# Patient Record
Sex: MALE | NOT HISPANIC OR LATINO | Employment: OTHER | URBAN - METROPOLITAN AREA
[De-identification: names, ages, dates, MRNs, and addresses within clinical notes are randomized per-mention and may not be internally consistent; named-entity substitution may affect disease eponyms.]

---

## 2021-10-02 ENCOUNTER — HOSPITAL ENCOUNTER (EMERGENCY)
Facility: HOSPITAL | Age: 61
Discharge: HOME | End: 2021-10-02
Attending: EMERGENCY MEDICINE | Admitting: EMERGENCY MEDICINE

## 2021-10-02 ENCOUNTER — APPOINTMENT (OUTPATIENT)
Dept: GENERAL RADIOLOGY | Facility: HOSPITAL | Age: 61
End: 2021-10-02

## 2021-10-02 VITALS
SYSTOLIC BLOOD PRESSURE: 111 MMHG | TEMPERATURE: 98.4 F | HEART RATE: 65 BPM | DIASTOLIC BLOOD PRESSURE: 72 MMHG | RESPIRATION RATE: 16 BRPM | OXYGEN SATURATION: 99 %
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
## 2021-10-02 DIAGNOSIS — S40.021A ARM CONTUSION, RIGHT, INITIAL ENCOUNTER: ICD-10-CM

## 2021-10-02 DIAGNOSIS — S76.011A HIP STRAIN, RIGHT, INITIAL ENCOUNTER: ICD-10-CM

## 2021-10-02 PROCEDURE — 73030 X-RAY EXAM OF SHOULDER: CPT

## 2021-10-02 PROCEDURE — 73502 X-RAY EXAM HIP UNI 2-3 VIEWS: CPT

## 2021-10-02 PROCEDURE — 99283 EMERGENCY DEPT VISIT LOW MDM: CPT

## 2021-10-02 PROCEDURE — 73060 X-RAY EXAM OF HUMERUS: CPT

## 2021-10-02 NOTE — DISCHARGE INSTRUCTIONS
Home to rest  Ice to sore areas  Tylenol for pain  Follow up with your doctor  Return if worse or new concerns such as abdominal pain, headaches, or chest pain or difficulty breathing.   Continue care with your primary care physician and have your blood pressure regularly checked and managed. Normal blood pressure is 120/80.
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
## 2023-11-11 ENCOUNTER — APPOINTMENT (OUTPATIENT)
Dept: CT IMAGING | Facility: HOSPITAL | Age: 63
End: 2023-11-11

## 2023-11-11 ENCOUNTER — HOSPITAL ENCOUNTER (EMERGENCY)
Facility: HOSPITAL | Age: 63
Discharge: HOME | End: 2023-11-11
Attending: EMERGENCY MEDICINE

## 2023-11-11 VITALS
RESPIRATION RATE: 18 BRPM | SYSTOLIC BLOOD PRESSURE: 134 MMHG | BODY MASS INDEX: 21.48 KG/M2 | DIASTOLIC BLOOD PRESSURE: 67 MMHG | TEMPERATURE: 96.8 F | WEIGHT: 145 LBS | HEART RATE: 54 BPM | HEIGHT: 69 IN | OXYGEN SATURATION: 98 %

## 2023-11-11 DIAGNOSIS — M54.50 ACUTE BILATERAL LOW BACK PAIN WITHOUT SCIATICA: Primary | ICD-10-CM

## 2023-11-11 LAB
ALBUMIN SERPL-MCNC: 4.4 G/DL (ref 3.5–5.2)
ALBUMIN/GLOB SERPL: 2.1 G/DL
ALP SERPL-CCNC: 69 U/L (ref 39–117)
ALT SERPL W P-5'-P-CCNC: 15 U/L (ref 1–41)
ANION GAP SERPL CALCULATED.3IONS-SCNC: 7.5 MMOL/L (ref 5–15)
AST SERPL-CCNC: 22 U/L (ref 1–40)
BASOPHILS # BLD AUTO: 0.04 10*3/MM3 (ref 0–0.2)
BASOPHILS NFR BLD AUTO: 0.6 % (ref 0–1.5)
BILIRUB SERPL-MCNC: 0.4 MG/DL (ref 0–1.2)
BILIRUB UR QL STRIP: NEGATIVE
BUN SERPL-MCNC: 15 MG/DL (ref 8–23)
BUN/CREAT SERPL: 15 (ref 7–25)
CALCIUM SPEC-SCNC: 9.1 MG/DL (ref 8.6–10.5)
CHLORIDE SERPL-SCNC: 109 MMOL/L (ref 98–107)
CLARITY UR: CLEAR
CO2 SERPL-SCNC: 25.5 MMOL/L (ref 22–29)
COLOR UR: YELLOW
CREAT SERPL-MCNC: 1 MG/DL (ref 0.76–1.27)
DEPRECATED RDW RBC AUTO: 44 FL (ref 37–54)
EGFRCR SERPLBLD CKD-EPI 2021: 85.1 ML/MIN/1.73
EOSINOPHIL # BLD AUTO: 0.15 10*3/MM3 (ref 0–0.4)
EOSINOPHIL NFR BLD AUTO: 2.4 % (ref 0.3–6.2)
ERYTHROCYTE [DISTWIDTH] IN BLOOD BY AUTOMATED COUNT: 12.8 % (ref 12.3–15.4)
GLOBULIN UR ELPH-MCNC: 2.1 GM/DL
GLUCOSE SERPL-MCNC: 98 MG/DL (ref 65–99)
GLUCOSE UR STRIP-MCNC: NEGATIVE MG/DL
HCT VFR BLD AUTO: 40.3 % (ref 37.5–51)
HGB BLD-MCNC: 13.7 G/DL (ref 13–17.7)
HGB UR QL STRIP.AUTO: NEGATIVE
IMM GRANULOCYTES # BLD AUTO: 0.02 10*3/MM3 (ref 0–0.05)
IMM GRANULOCYTES NFR BLD AUTO: 0.3 % (ref 0–0.5)
KETONES UR QL STRIP: NEGATIVE
LEUKOCYTE ESTERASE UR QL STRIP.AUTO: NEGATIVE
LIPASE SERPL-CCNC: 16 U/L (ref 13–60)
LYMPHOCYTES # BLD AUTO: 2.77 10*3/MM3 (ref 0.7–3.1)
LYMPHOCYTES NFR BLD AUTO: 43.5 % (ref 19.6–45.3)
MCH RBC QN AUTO: 32 PG (ref 26.6–33)
MCHC RBC AUTO-ENTMCNC: 34 G/DL (ref 31.5–35.7)
MCV RBC AUTO: 94.2 FL (ref 79–97)
MONOCYTES # BLD AUTO: 0.51 10*3/MM3 (ref 0.1–0.9)
MONOCYTES NFR BLD AUTO: 8 % (ref 5–12)
NEUTROPHILS NFR BLD AUTO: 2.88 10*3/MM3 (ref 1.7–7)
NEUTROPHILS NFR BLD AUTO: 45.2 % (ref 42.7–76)
NITRITE UR QL STRIP: NEGATIVE
NRBC BLD AUTO-RTO: 0 /100 WBC (ref 0–0.2)
PH UR STRIP.AUTO: <=5 [PH] (ref 5–8)
PLATELET # BLD AUTO: 230 10*3/MM3 (ref 140–450)
PMV BLD AUTO: 8.9 FL (ref 6–12)
POTASSIUM SERPL-SCNC: 4.4 MMOL/L (ref 3.5–5.2)
PROT SERPL-MCNC: 6.5 G/DL (ref 6–8.5)
PROT UR QL STRIP: NEGATIVE
RBC # BLD AUTO: 4.28 10*6/MM3 (ref 4.14–5.8)
SODIUM SERPL-SCNC: 142 MMOL/L (ref 136–145)
SP GR UR STRIP: 1.01 (ref 1–1.03)
UROBILINOGEN UR QL STRIP: NORMAL
WBC NRBC COR # BLD: 6.37 10*3/MM3 (ref 3.4–10.8)

## 2023-11-11 PROCEDURE — 96374 THER/PROPH/DIAG INJ IV PUSH: CPT

## 2023-11-11 PROCEDURE — 96375 TX/PRO/DX INJ NEW DRUG ADDON: CPT

## 2023-11-11 PROCEDURE — 25010000002 HYDROMORPHONE PER 4 MG: Performed by: EMERGENCY MEDICINE

## 2023-11-11 PROCEDURE — 83690 ASSAY OF LIPASE: CPT | Performed by: EMERGENCY MEDICINE

## 2023-11-11 PROCEDURE — 81003 URINALYSIS AUTO W/O SCOPE: CPT | Performed by: EMERGENCY MEDICINE

## 2023-11-11 PROCEDURE — 85025 COMPLETE CBC W/AUTO DIFF WBC: CPT | Performed by: EMERGENCY MEDICINE

## 2023-11-11 PROCEDURE — 74176 CT ABD & PELVIS W/O CONTRAST: CPT

## 2023-11-11 PROCEDURE — 99284 EMERGENCY DEPT VISIT MOD MDM: CPT

## 2023-11-11 PROCEDURE — 80053 COMPREHEN METABOLIC PANEL: CPT | Performed by: EMERGENCY MEDICINE

## 2023-11-11 PROCEDURE — 25010000002 ONDANSETRON PER 1 MG: Performed by: EMERGENCY MEDICINE

## 2023-11-11 RX ORDER — SODIUM CHLORIDE 0.9 % (FLUSH) 0.9 %
10 SYRINGE (ML) INJECTION AS NEEDED
Status: DISCONTINUED | OUTPATIENT
Start: 2023-11-11 | End: 2023-11-11 | Stop reason: HOSPADM

## 2023-11-11 RX ORDER — ONDANSETRON 2 MG/ML
4 INJECTION INTRAMUSCULAR; INTRAVENOUS ONCE
Status: COMPLETED | OUTPATIENT
Start: 2023-11-11 | End: 2023-11-11

## 2023-11-11 RX ORDER — CYCLOBENZAPRINE HCL 10 MG
10 TABLET ORAL 3 TIMES DAILY PRN
Qty: 15 TABLET | Refills: 0 | Status: SHIPPED | OUTPATIENT
Start: 2023-11-11

## 2023-11-11 RX ORDER — HYDROCODONE BITARTRATE AND ACETAMINOPHEN 5; 325 MG/1; MG/1
1 TABLET ORAL EVERY 6 HOURS PRN
Qty: 8 TABLET | Refills: 0 | Status: SHIPPED | OUTPATIENT
Start: 2023-11-11

## 2023-11-11 RX ORDER — HYDROMORPHONE HYDROCHLORIDE 1 MG/ML
0.5 INJECTION, SOLUTION INTRAMUSCULAR; INTRAVENOUS; SUBCUTANEOUS ONCE
Status: COMPLETED | OUTPATIENT
Start: 2023-11-11 | End: 2023-11-11

## 2023-11-11 RX ADMIN — ONDANSETRON 4 MG: 2 INJECTION INTRAMUSCULAR; INTRAVENOUS at 11:31

## 2023-11-11 RX ADMIN — HYDROMORPHONE HYDROCHLORIDE 0.5 MG: 1 INJECTION, SOLUTION INTRAMUSCULAR; INTRAVENOUS; SUBCUTANEOUS at 11:33

## 2023-11-11 NOTE — ED PROVIDER NOTES
EMERGENCY DEPARTMENT ENCOUNTER    Room Number:  26/26  PCP: Benny Kovacs MD  Historian: Patient      HPI:  Chief Complaint: Bilateral lower back pain  A complete HPI/ROS/PMH/PSH/SH/FH are unobtainable due to: None  Context: Reese Chery is a 63 y.o. male who presents to the ED c/o bilateral low back pain.  Patient has not had significant back issues in the past.  States pain started yesterday gradually.  Bilateral lower radiating to the front.  Patient states it is definitely worse with movement.  Has had no blood in the urine.  No burning with urination.  Pain does not go down the legs.  Has had no vomiting or diarrhea.            PAST MEDICAL HISTORY  Active Ambulatory Problems     Diagnosis Date Noted    No Active Ambulatory Problems     Resolved Ambulatory Problems     Diagnosis Date Noted    No Resolved Ambulatory Problems     Past Medical History:   Diagnosis Date    Renal disorder          PAST SURGICAL HISTORY  History reviewed. No pertinent surgical history.      FAMILY HISTORY  History reviewed. No pertinent family history.      SOCIAL HISTORY  Social History     Socioeconomic History    Marital status:    Tobacco Use    Smoking status: Every Day     Types: Cigarettes   Substance and Sexual Activity    Alcohol use: Never    Drug use: Never         ALLERGIES  Patient has no known allergies.        REVIEW OF SYSTEMS  Review of Systems   Back pain      PHYSICAL EXAM  ED Triage Vitals   Temp Heart Rate Resp BP SpO2   11/11/23 1102 11/11/23 1102 11/11/23 1102 11/11/23 1108 11/11/23 1102   96.8 °F (36 °C) 77 18 134/76 99 %      Temp src Heart Rate Source Patient Position BP Location FiO2 (%)   -- -- -- -- --              Physical Exam      GENERAL: Patient appears uncomfortable  HENT: nares patent  EYES: no scleral icterus  CV: regular rhythm, normal rate  RESPIRATORY: normal effort  ABDOMEN: soft  MUSCULOSKELETAL: no deformity.  Tenderness to bilateral lower back  NEURO: alert, moves all  extremities, follows commands  PSYCH:  calm, cooperative  SKIN: warm, dry    Vital signs and nursing notes reviewed.          LAB RESULTS  Recent Results (from the past 24 hour(s))   Comprehensive Metabolic Panel    Collection Time: 11/11/23 11:34 AM    Specimen: Blood   Result Value Ref Range    Glucose 98 65 - 99 mg/dL    BUN 15 8 - 23 mg/dL    Creatinine 1.00 0.76 - 1.27 mg/dL    Sodium 142 136 - 145 mmol/L    Potassium 4.4 3.5 - 5.2 mmol/L    Chloride 109 (H) 98 - 107 mmol/L    CO2 25.5 22.0 - 29.0 mmol/L    Calcium 9.1 8.6 - 10.5 mg/dL    Total Protein 6.5 6.0 - 8.5 g/dL    Albumin 4.4 3.5 - 5.2 g/dL    ALT (SGPT) 15 1 - 41 U/L    AST (SGOT) 22 1 - 40 U/L    Alkaline Phosphatase 69 39 - 117 U/L    Total Bilirubin 0.4 0.0 - 1.2 mg/dL    Globulin 2.1 gm/dL    A/G Ratio 2.1 g/dL    BUN/Creatinine Ratio 15.0 7.0 - 25.0    Anion Gap 7.5 5.0 - 15.0 mmol/L    eGFR 85.1 >60.0 mL/min/1.73   Lipase    Collection Time: 11/11/23 11:34 AM    Specimen: Blood   Result Value Ref Range    Lipase 16 13 - 60 U/L   CBC Auto Differential    Collection Time: 11/11/23 11:34 AM    Specimen: Blood   Result Value Ref Range    WBC 6.37 3.40 - 10.80 10*3/mm3    RBC 4.28 4.14 - 5.80 10*6/mm3    Hemoglobin 13.7 13.0 - 17.7 g/dL    Hematocrit 40.3 37.5 - 51.0 %    MCV 94.2 79.0 - 97.0 fL    MCH 32.0 26.6 - 33.0 pg    MCHC 34.0 31.5 - 35.7 g/dL    RDW 12.8 12.3 - 15.4 %    RDW-SD 44.0 37.0 - 54.0 fl    MPV 8.9 6.0 - 12.0 fL    Platelets 230 140 - 450 10*3/mm3    Neutrophil % 45.2 42.7 - 76.0 %    Lymphocyte % 43.5 19.6 - 45.3 %    Monocyte % 8.0 5.0 - 12.0 %    Eosinophil % 2.4 0.3 - 6.2 %    Basophil % 0.6 0.0 - 1.5 %    Immature Grans % 0.3 0.0 - 0.5 %    Neutrophils, Absolute 2.88 1.70 - 7.00 10*3/mm3    Lymphocytes, Absolute 2.77 0.70 - 3.10 10*3/mm3    Monocytes, Absolute 0.51 0.10 - 0.90 10*3/mm3    Eosinophils, Absolute 0.15 0.00 - 0.40 10*3/mm3    Basophils, Absolute 0.04 0.00 - 0.20 10*3/mm3    Immature Grans, Absolute 0.02 0.00 -  0.05 10*3/mm3    nRBC 0.0 0.0 - 0.2 /100 WBC   Urinalysis With Microscopic If Indicated (No Culture) - Urine, Clean Catch    Collection Time: 11/11/23 12:31 PM    Specimen: Urine, Clean Catch   Result Value Ref Range    Color, UA Yellow Yellow, Straw    Appearance, UA Clear Clear    pH, UA <=5.0 5.0 - 8.0    Specific Gravity, UA 1.010 1.005 - 1.030    Glucose, UA Negative Negative    Ketones, UA Negative Negative    Bilirubin, UA Negative Negative    Blood, UA Negative Negative    Protein, UA Negative Negative    Leuk Esterase, UA Negative Negative    Nitrite, UA Negative Negative    Urobilinogen, UA 0.2 E.U./dL 0.2 - 1.0 E.U./dL       Ordered the above labs and reviewed the results.        RADIOLOGY  CT Abdomen Pelvis Without Contrast    Result Date: 11/11/2023  EXAMINATION: CT OF THE ABDOMEN AND PELVIS WITHOUT CONTRAST  HISTORY: 63-year-old male with a history of abdominal pain and flank pain.  TECHNIQUE: Contiguous axial images were obtained through the abdomen and pelvis without intravenous administration of nonionic contrast. Oral contrast was not administered. Radiation dose reduction techniques were utilized, including automated exposure control and exposure modulation based on body size.  COMPARISON: None  FINDINGS: The visualized portion of the lung bases demonstrate moderate bilateral lobe paraseptal emphysematous change. The visualized portion of the heart appears normal. The liver has a normal appearance. The pancreas appears normal. There is a 1.4 x 0.7 x 1.8 cm right renal pelvis staghorn calculus. There is a 1.6 x 1.3 x 2.3 cm left renal pelvis staghorn calculus. There is no hydronephrosis or hydroureter. The urinary bladder appears normal. The adrenal glands have a normal appearance. The spleen appears normal. The bowel appears normal. L5-S1 degenerative disc disease with abutting endplate osteophytic change is noted.       There are bilateral renal staghorn calculi as described. No hydronephrosis or  hydroureter is appreciated. A ureteral and bladder calculus is not visualized.       Ordered the above noted radiological studies.  CT abdomen independently interpreted by me and shows no evidence of passing kidney stone          PROCEDURES  Procedures            MEDICATIONS GIVEN IN ER  Medications   sodium chloride 0.9 % flush 10 mL (has no administration in time range)   HYDROmorphone (DILAUDID) injection 0.5 mg (0.5 mg Intravenous Given 11/11/23 1133)   ondansetron (ZOFRAN) injection 4 mg (4 mg Intravenous Given 11/11/23 1131)                   MEDICAL DECISION MAKING, PROGRESS, and CONSULTS     Discussion below represents my analysis of pertinent findings related to patient's condition, differential diagnosis, treatment plan and final disposition.      Additional sources:  - Discussed/ obtained information from independent historians: None    - External (non-ED) record review: Epic reviewed patient last seen by primary provider 9/23/2020 for hyperlipidemia    - Chronic or social conditions impacting care: None    - Shared decision making: None      Orders placed during this visit:  Orders Placed This Encounter   Procedures    CT Abdomen Pelvis Without Contrast    Comprehensive Metabolic Panel    Lipase    Urinalysis With Microscopic If Indicated (No Culture) - Urine, Clean Catch    CBC Auto Differential    Insert Peripheral IV    CBC & Differential         Additional orders considered but not ordered:  None        Differential diagnosis includes but is not limited to:    Musculoskeletal back pain versus sciatica versus kidney stone versus kidney infection      Independent interpretation of labs, radiology studies, and discussions with consultants:  ED Course as of 11/11/23 1313   Sat Nov 11, 2023   1254 12:54 EST  Patient presents for bilateral lower back pain.  Was concerned about kidney stone.  Patient does have stones in his kidneys but is not passing a kidney stone.  Patient also has no evidence of kidney  infection.  Has been given pain medicine here with improvement of symptoms.  We will start him on muscle relaxer.  We will give him small amount of pain medication.  We will give him the name of [facility] to follow-up for his stones.  He is to follow-up with his primary doctor for his back pain. [SL]      ED Course User Index  [SL] Fredo Terrell MD                 DIAGNOSIS  Final diagnoses:   Acute bilateral low back pain without sciatica         DISPOSITION  DISCHARGE    Patient discharged in stable condition.    Reviewed implications of results, diagnosis, meds, responsibility to follow up, warning signs and symptoms of possible worsening, potential complications and reasons to return to ER, including worsening symptoms    Patient/Family voiced understanding of above instructions.    Discussed plan for discharge, as there is no emergent indication for admission. Patient referred to primary care provider for BP management due to today's BP. Pt/family is agreeable and understands need for follow up and repeat testing.  Pt is aware that discharge does not mean that nothing is wrong but it indicates no emergency is present that requires admission and they must continue care with follow-up as given below or physician of their choice.     FOLLOW-UP  Benny Kovacs MD  [address]  [address] [id number] [phone]    Schedule an appointment as soon as possible for a visit       [facility]  [address] [id number] [phone]  Schedule an appointment as soon as possible for a visit            Medication List        New Prescriptions      cyclobenzaprine 10 MG tablet  Commonly known as: FLEXERIL  Take 1 tablet by mouth 3 (Three) Times a Day As Needed for Muscle Spasms.     HYDROcodone-acetaminophen 5-325 MG per tablet  Commonly known as: NORCO  Take 1 tablet by mouth Every 6 (Six) Hours As Needed for Moderate Pain.               Where to Get Your  Medications        These medications were sent to [facility] [id number] - [place], [place] - [address] AT [facility] [address] - [phone] PH - [phone] FX  [address] , [facility] [address]      Phone: [phone]   cyclobenzaprine 10 MG tablet  HYDROcodone-acetaminophen 5-325 MG per tablet                  Latest Documented Vital Signs:  As of 13:13 EST  BP- 117/69 HR- 56 Temp- 96.8 °F (36 °C) O2 sat- 96%      ZEHRA reviewed by Fredo Terrell MD       --    Please note that portions of this were completed with a voice recognition program.       Note Disclaimer: At [facility], we believe that sharing information builds trust and better relationships. You are receiving this note because you are receiving care at [facility] or recently visited. It is possible you will see health information before a provider has talked with you about it. This kind of information can be easy to misunderstand. To help you fully understand what it means for your health, we urge you to discuss this note with your provider.            Fredo Terrell MD  11/11/23 0768

## 2023-11-11 NOTE — ED NOTES
Pt to ER via PV from home for flank pain x a few days. Pain is 10/10 R flank area. Denies urinary issues